# Patient Record
(demographics unavailable — no encounter records)

---

## 2018-08-20 NOTE — EDM.PDOC
ED HPI GENERAL MEDICAL PROBLEM





- General


Chief Complaint: Chest Pain


Stated Complaint: chest pain


Time Seen by Provider: 08/20/18 18:31


Source of Information: Reports: Patient


History Limitations: Reports: No Limitations





- History of Present Illness


INITIAL COMMENTS - FREE TEXT/NARRATIVE: 


HISTORY AND PHYSICAL:





History of present illness:


Patient is a 74-year-old male who is brought to the emergency room by law 

enforcement for medical evaluation. Patient reports he has had intermittent 

chest pain and numbness and tingling of the left arm for the past one hour. 

Patient reports that he "always has chest pain" but reports today was 

different. He states he is unable to explain how today's episode is different 

versus previous episodes. 


He denies any fever, chills, shortness of breath or cough. Denies any abdominal 

pain, nausea, vomiting, diarrhea or constipation.


History of hypertension.





Review of systems: 


As per history of present illness and below otherwise all systems reviewed and 

negative.





Past medical history: 


As per history of present illness and as reviewed below otherwise 

noncontributory.





Surgical history: 


As per history of present illness and as reviewed below otherwise 

noncontributory.





Social history: 


No reported history of drug or alcohol abuse.





Family history: 


As per history of present illness and as reviewed below otherwise 

noncontributory.





Physical exam:


General: Well-developed and well-nourished 74-year-old male. Alert and 

oriented. Nontoxic appearing and in no acute distress.


HEENT: Atraumatic, normocephalic, pupils equal and reactive bilaterally, 

negative for conjunctival pallor or scleral icterus, mucous membranes moist, 

throat clear, neck supple, nontender, trachea midline. No drooling or trismus 

noted. No meningeal signs


Lungs: Clear to auscultation, breath sounds equal bilaterally, chest nontender.


Heart: S1S2, regular rate and rhythm without overt murmur


Abdomen: Soft, nondistended, nontender. Negative for masses or 

hepatosplenomegaly. Negative for costovertebral tenderness.


Pelvis: Stable nontender.


Genitourinary: Deferred.


Rectal: Deferred.


Skin: Intact, warm, dry. No lesions or rashes noted.


Extremities: Atraumatic, negative for cords or calf pain. Neurovascular 

unremarkable.


Neuro: Awake, alert, oriented. Cranial nerves II through XII unremarkable. 

Cerebellum unremarkable. Motor and sensory unremarkable throughout. Exam 

nonfocal.





Notes:


Lab work is unremarkable. Chest x-ray shows minimal right basilar atelectasis 

area no evidence of pneumonia or infiltrate. It is currently pain-free. I did 

offer the patient admission. He is aware of risks and benefits of leaving 

without continued observation. He accepts those risks and would like to be 

discharged to home. Patient is currently in custody of law enforcement.





Upon getting the patient ready for discharge he states he would like to stay 

for observation. Vital signs are stable. Dr Piedra was consulted on this case, 

agreeable to observing this patient.





Diagnostics:


CBC, CMP, troponin, EKG, one view chest





Therapeutics:


Aspirin, Nitro Sublingual





Impression: 


Chest Pain r/o MI





Plan:


Observation admission with Telemetry





Definitive disposition and diagnosis as appropriate pending reevaluation and 

review of above.





  ** chest


Pain Score (Numeric/FACES): 8





- Related Data


 Allergies











Allergy/AdvReac Type Severity Reaction Status Date / Time


 


chocolate flavor Allergy  Cough Verified 08/20/18 18:34


 


oats Allergy  Cough Verified 08/20/18 18:34


 


cereal Allergy  Cough Uncoded 08/20/18 18:34











Home Meds: 


 Home Meds





Lisinopril/Hydrochlorothiazide [Lisinopril-Hctz 20-12.5 mg Tab] 1 tab PO DAILY 

12/21/15 [History]


Sertraline HCl 100 mg PO DAILY 12/21/15 [History]


amLODIPine Besylate [Amlodipine Besylate] 10 mg PO DAILY 12/21/15 [History]


Gabapentin [Neurontin] 300 mg PO BEDTIME #45 cap 06/23/16 [Rx]











Past Medical History


HEENT History: Reports: None


Other HEENT History: wears glasses


Cardiovascular History: Reports: Arrhythmia, Hypertension


Other Cardiovascular History: sinus


Respiratory History: Reports: None


Gastrointestinal History: Reports: GERD


Genitourinary History: Reports: BPH


Other Genitourinary History: swelling of the testicles for a year after he fell 

and hit his testicles on a side rail


Musculoskeletal History: Reports: Arthritis


Neurological History: Reports: CVA, Seizure


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 

include inability to read and write. Has been seizure free since 1991.


Psychiatric History: Reports: Anxiety


Endocrine/Metabolic History: Reports: None


Hematologic History: Reports: None


Immunologic History: Reports: None


Oncologic (Cancer) History: Reports: Bone


Other Oncologic History: Bone tumor


Dermatologic History: Reports: None





- Past Surgical History


Head Surgeries/Procedures: Reports: None


HEENT Surgical History: Reports: None


Cardiovascular Surgical History: Reports: None


Respiratory Surgical History: Reports: None


GI Surgical History: Reports: None


Male  Surgical History: Reports: None


Endocrine Surgical History: Reports: None


Neurological Surgical History: Reports: None


Musculoskeletal Surgical History: Reports: Knee Replacement, Shoulder Surgery, 

Other (See Below)


Other Musculoskeletal Surgeries/Procedures:: bone transplant for bone CA


Oncologic Surgical History: Reports: None


Other Oncologic Surgeries/Procedures: tumor removal right humerus





Social & Family History





- Family History


Family Medical History: Noncontributory


Cardiac: Reports: Heart Failure


Endocrine/Metabolic: Reports: Diabetes, type II





- Tobacco Use


Smoking Status *Q: Never Smoker





- Caffeine Use


Caffeine Use: Reports: Coffee, Soda





- Recreational Drug Use


Recreational Drug Use: No





ED ROS GENERAL





- Review of Systems


Review Of Systems: ROS reveals no pertinent complaints other than HPI.





ED EXAM, GENERAL





- Physical Exam


Exam: See Below (See dictation)





Course





- Vital Signs


Last Recorded V/S: 


 Last Vital Signs











Temp  97.6 F   08/20/18 18:32


 


Pulse  84   08/20/18 18:55


 


Resp  12   08/20/18 18:55


 


BP  99/68   08/20/18 18:55


 


Pulse Ox  91 L  08/20/18 18:55














- Orders/Labs/Meds


Orders: 


 Active Orders 24 hr











 Category Date Time Status


 


 EKG Documentation Completion [RC] STAT Care  08/20/18 18:26 Active


 


 Chest 1V Frontal [CR] Stat Exams  08/20/18 18:26 Taken


 


 UA W/MICROSCOPIC [URIN] Stat Lab  08/20/18 19:15 Ordered


 


 Nitroglycerin [Nitrostat] Med  08/20/18 18:43 Active





 0.4 mg SL Q5M PRN   


 


 Sodium Chloride 0.9% [Normal Saline] 1,000 ml Med  08/20/18 19:12 Active





 IV STAT   








 Medication Orders





Sodium Chloride (Normal Saline)  1,000 mls @ 999 mls/hr IV STAT ONE


   Stop: 08/20/18 20:12


Nitroglycerin (Nitrostat)  0.4 mg SL Q5M PRN


   PRN Reason: Chest Pain


   Last Admin: 08/20/18 18:49  Dose: 0.4 mg








Labs: 


 Laboratory Tests











  08/20/18 08/20/18 08/20/18 Range/Units





  18:35 18:35 19:15 


 


WBC  7.65    (4.0-11.0)  K/uL


 


RBC  4.67    (4.50-5.90)  M/uL


 


Hgb  14.1    (13.0-17.0)  g/dL


 


Hct  43.0    (38.0-50.0)  %


 


MCV  92.1    (80.0-98.0)  fL


 


MCH  30.2    (27.0-32.0)  pg


 


MCHC  32.8    (31.0-37.0)  g/dL


 


RDW Std Deviation  46.4    (28.0-62.0)  fl


 


RDW Coeff of Michel  14    (11.0-15.0)  %


 


Plt Count  253    (150-400)  K/uL


 


MPV  9.80    (7.40-12.00)  fL


 


Neut % (Auto)  62.1    (48.0-80.0)  %


 


Lymph % (Auto)  28.2    (16.0-40.0)  %


 


Mono % (Auto)  7.8    (0.0-15.0)  %


 


Eos % (Auto)  1.6    (0.0-7.0)  %


 


Baso % (Auto)  0.3    (0.0-1.5)  %


 


Neut # (Auto)  4.8    (1.4-5.7)  K/uL


 


Lymph # (Auto)  2.2    (0.6-2.4)  K/uL


 


Mono # (Auto)  0.6    (0.0-0.8)  K/uL


 


Eos # (Auto)  0.1    (0.0-0.7)  K/uL


 


Baso # (Auto)  0.0    (0.0-0.1)  K/uL


 


Nucleated RBC %  0.0    /100WBC


 


Nucleated RBCs #  0    K/uL


 


Sodium   140   (136-148)  mmol/L


 


Potassium   4.4   (3.5-5.1)  mmol/L


 


Chloride   105   ()  mmol/L


 


Carbon Dioxide   28.9   (21.0-32.0)  mmol/L


 


BUN   22 H   (7.0-18.0)  mg/dL


 


Creatinine   1.2   (0.8-1.3)  mg/dL


 


Est Cr Clr Drug Dosing   57.52   mL/min


 


Estimated GFR (MDRD)   59.2   ml/min


 


Glucose   111 H   ()  mg/dL


 


Calcium   9.5   (8.5-10.1)  mg/dL


 


Total Bilirubin   0.7   (0.2-1.0)  mg/dL


 


AST   17   (15-37)  IU/L


 


ALT   24   (14-63)  IU/L


 


Alkaline Phosphatase   80   ()  U/L


 


Troponin I   < 0.050   (0.000-0.056)  ng/mL


 


Total Protein   7.4   (6.4-8.2)  g/dL


 


Albumin   4.1   (3.4-5.0)  g/dL


 


Globulin   3.3   (2.0-3.5)  g/dL


 


Albumin/Globulin Ratio   1.2 L   (1.3-2.8)  


 


Urine Color    YELLOW  


 


Urine Appearance    CLEAR  


 


Urine pH    6.0  (5.0-8.0)  


 


Ur Specific Gravity    1.025  (1.001-1.035)  


 


Urine Protein    NEGATIVE  (NEGATIVE)  mg/dL


 


Urine Glucose (UA)    NEGATIVE  (NEGATIVE)  mg/dL


 


Urine Ketones    TRACE H  (NEGATIVE)  mg/dL


 


Urine Occult Blood    NEGATIVE  (NEGATIVE)  


 


Urine Nitrite    NEGATIVE  (NEGATIVE)  


 


Urine Bilirubin    NEGATIVE  (NEGATIVE)  


 


Urine Urobilinogen    0.2  (<2.0)  EU/dL


 


Ur Leukocyte Esterase    NEGATIVE  (NEGATIVE)  


 


Urine RBC    0-2  (0-2/HPF)  


 


Urine WBC    1-3  (0-5/HPF)  


 


Ur Epithelial Cells    RARE  (NONE-FEW)  


 


Urine Bacteria    FEW  (NEGATIVE)  


 


Urine Mucus    LIGHT  (NONE-MOD)  











Meds: 


Medications











Generic Name Dose Route Start Last Admin





  Trade Name Freq  PRN Reason Stop Dose Admin


 


Sodium Chloride  1,000 mls @ 999 mls/hr  08/20/18 19:12  





  Normal Saline  IV  08/20/18 20:12  





  STAT ONE   





     





     





     





     


 


Nitroglycerin  0.4 mg  08/20/18 18:43  08/20/18 18:49





  Nitrostat  SL   0.4 mg





  Q5M PRN   Administration





  Chest Pain   





     





     





     














Discontinued Medications














Generic Name Dose Route Start Last Admin





  Trade Name Freq  PRN Reason Stop Dose Admin


 


Aspirin  324 mg  08/20/18 18:26  08/20/18 18:48





  Aspirin  PO  08/20/18 18:27  324 mg





  ONETIME ONE   Administration





     





     





     





     














Departure





- Departure


Time of Disposition: 19:38


Disposition: Refer to Observation


Clinical Impression: 


 Chest pain, rule out acute myocardial infarction





Referrals: 


PCP,Unknown [Primary Care Provider] - 


Forms:  ED Department Discharge





- My Orders


Last 24 Hours: 


My Active Orders





08/20/18 18:26


EKG Documentation Completion [RC] STAT 


Chest 1V Frontal [CR] Stat 





08/20/18 18:43


Nitroglycerin [Nitrostat]   0.4 mg SL Q5M PRN 





08/20/18 19:12


Sodium Chloride 0.9% [Normal Saline] 1,000 ml IV STAT 





08/20/18 19:15


UA W/MICROSCOPIC [URIN] Stat 














- Assessment/Plan


Last 24 Hours: 


My Active Orders





08/20/18 18:26


EKG Documentation Completion [RC] STAT 


Chest 1V Frontal [CR] Stat 





08/20/18 18:43


Nitroglycerin [Nitrostat]   0.4 mg SL Q5M PRN 





08/20/18 19:12


Sodium Chloride 0.9% [Normal Saline] 1,000 ml IV STAT 





08/20/18 19:15


UA W/MICROSCOPIC [URIN] Stat

## 2018-08-20 NOTE — PCM.HP
H&P History of Present Illness





- General


Admit Problem/Dx: 


 Admission Diagnosis/Problem





Admission Diagnosis/Problem      Chest pain, rule out acute myocardial 

infarction








  ** chest


Pain Score (Numeric/FACES): 8





- Related Data


Allergies/Adverse Reactions: 


 Allergies











Allergy/AdvReac Type Severity Reaction Status Date / Time


 


alcohol Allergy  Vomiting Verified 08/20/18 20:53


 


chocolate flavor Allergy  Cough Verified 08/20/18 18:34


 


oats Allergy  Cough Verified 08/20/18 18:34


 


cereal Allergy  Cough Uncoded 08/20/18 18:34











Home Medications: 


 Home Meds





Lisinopril/Hydrochlorothiazide [Lisinopril-Hctz 20-12.5 mg Tab] 1 tab PO DAILY 

12/21/15 [History]


Sertraline HCl 100 mg PO DAILY 12/21/15 [History]


amLODIPine Besylate [Amlodipine Besylate] 10 mg PO DAILY 12/21/15 [History]


Gabapentin [Neurontin] 300 mg PO TID 08/20/18 [History]











Past Medical History


HEENT History: Reports: None


Other HEENT History: wears glasses


Cardiovascular History: Reports: Arrhythmia, Hypertension


Other Cardiovascular History: sinus


Respiratory History: Reports: None


Gastrointestinal History: Reports: GERD


Genitourinary History: Reports: BPH


Other Genitourinary History: swelling of the testicles


Musculoskeletal History: Reports: Arthritis


Neurological History: Reports: CVA, Seizure


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 

include inability to read and write. Has been seizure free since 1991.


Psychiatric History: Reports: None


Endocrine/Metabolic History: Reports: None


Hematologic History: Reports: None


Immunologic History: Reports: None


Oncologic (Cancer) History: Reports: Bone


Other Oncologic History: Bone tumor


Dermatologic History: Reports: None





- Infectious Disease History


Infectious Disease History: Reports: None





- Past Surgical History


Head Surgeries/Procedures: Reports: None


HEENT Surgical History: Reports: None


Cardiovascular Surgical History: Reports: None


Respiratory Surgical History: Reports: None


GI Surgical History: Reports: None


Male  Surgical History: Reports: None


Endocrine Surgical History: Reports: None


Neurological Surgical History: Reports: None


Musculoskeletal Surgical History: Reports: Knee Replacement, Shoulder Surgery, 

Other (See Below)


Other Musculoskeletal Surgeries/Procedures:: bone transplant for bone CA (1969)


Oncologic Surgical History: Reports: None


Other Oncologic Surgeries/Procedures: tumor removal right humerus


Dermatological Surgical History: Reports: None





Social & Family History





- Family History


Family Medical History: Noncontributory


Cardiac: Reports: Heart Failure


Endocrine/Metabolic: Reports: Diabetes, type II





- Tobacco Use


Smoking Status *Q: Never Smoker





- Caffeine Use


Caffeine Use: Reports: Coffee, Soda





- Recreational Drug Use


Recreational Drug Use: No





Exam





- Vital Signs


Vital Signs: 


 Last Vital Signs











Temp  97.6 F   08/20/18 18:32


 


Pulse  66   08/20/18 20:03


 


Resp  12   08/20/18 20:03


 


BP  124/74   08/20/18 20:03


 


Pulse Ox  91 L  08/20/18 20:03











Weight: 208 lb 1.862 oz





- Patient Data


Lab Results Last 24 hrs: 


 Laboratory Results - last 24 hr











  08/20/18 08/20/18 08/20/18 Range/Units





  18:35 18:35 19:15 


 


WBC  7.65    (4.0-11.0)  K/uL


 


RBC  4.67    (4.50-5.90)  M/uL


 


Hgb  14.1    (13.0-17.0)  g/dL


 


Hct  43.0    (38.0-50.0)  %


 


MCV  92.1    (80.0-98.0)  fL


 


MCH  30.2    (27.0-32.0)  pg


 


MCHC  32.8    (31.0-37.0)  g/dL


 


RDW Std Deviation  46.4    (28.0-62.0)  fl


 


RDW Coeff of Michel  14    (11.0-15.0)  %


 


Plt Count  253    (150-400)  K/uL


 


MPV  9.80    (7.40-12.00)  fL


 


Neut % (Auto)  62.1    (48.0-80.0)  %


 


Lymph % (Auto)  28.2    (16.0-40.0)  %


 


Mono % (Auto)  7.8    (0.0-15.0)  %


 


Eos % (Auto)  1.6    (0.0-7.0)  %


 


Baso % (Auto)  0.3    (0.0-1.5)  %


 


Neut # (Auto)  4.8    (1.4-5.7)  K/uL


 


Lymph # (Auto)  2.2    (0.6-2.4)  K/uL


 


Mono # (Auto)  0.6    (0.0-0.8)  K/uL


 


Eos # (Auto)  0.1    (0.0-0.7)  K/uL


 


Baso # (Auto)  0.0    (0.0-0.1)  K/uL


 


Nucleated RBC %  0.0    /100WBC


 


Nucleated RBCs #  0    K/uL


 


Sodium   140   (136-148)  mmol/L


 


Potassium   4.4   (3.5-5.1)  mmol/L


 


Chloride   105   ()  mmol/L


 


Carbon Dioxide   28.9   (21.0-32.0)  mmol/L


 


BUN   22 H   (7.0-18.0)  mg/dL


 


Creatinine   1.2   (0.8-1.3)  mg/dL


 


Est Cr Clr Drug Dosing   57.52   mL/min


 


Estimated GFR (MDRD)   59.2   ml/min


 


Glucose   111 H   ()  mg/dL


 


Calcium   9.5   (8.5-10.1)  mg/dL


 


Total Bilirubin   0.7   (0.2-1.0)  mg/dL


 


AST   17   (15-37)  IU/L


 


ALT   24   (14-63)  IU/L


 


Alkaline Phosphatase   80   ()  U/L


 


Troponin I   < 0.050   (0.000-0.056)  ng/mL


 


Total Protein   7.4   (6.4-8.2)  g/dL


 


Albumin   4.1   (3.4-5.0)  g/dL


 


Globulin   3.3   (2.0-3.5)  g/dL


 


Albumin/Globulin Ratio   1.2 L   (1.3-2.8)  


 


Urine Color    YELLOW  


 


Urine Appearance    CLEAR  


 


Urine pH    6.0  (5.0-8.0)  


 


Ur Specific Gravity    1.025  (1.001-1.035)  


 


Urine Protein    NEGATIVE  (NEGATIVE)  mg/dL


 


Urine Glucose (UA)    NEGATIVE  (NEGATIVE)  mg/dL


 


Urine Ketones    TRACE H  (NEGATIVE)  mg/dL


 


Urine Occult Blood    NEGATIVE  (NEGATIVE)  


 


Urine Nitrite    NEGATIVE  (NEGATIVE)  


 


Urine Bilirubin    NEGATIVE  (NEGATIVE)  


 


Urine Urobilinogen    0.2  (<2.0)  EU/dL


 


Ur Leukocyte Esterase    NEGATIVE  (NEGATIVE)  


 


Urine RBC    0-2  (0-2/HPF)  


 


Urine WBC    1-3  (0-5/HPF)  


 


Ur Epithelial Cells    RARE  (NONE-FEW)  


 


Urine Bacteria    FEW  (NEGATIVE)  


 


Urine Mucus    LIGHT  (NONE-MOD)  











Result Diagrams: 


 08/20/18 18:35





 08/20/18 18:35


Orders Last 24hrs: 


 Active Orders 24 hr











 Category Date Time Status


 


 Admission Status [Patient Status] [ADT] Stat ADT  08/20/18 19:42 Active


 


 EKG Documentation Completion [RC] STAT Care  08/20/18 18:26 Active


 


 Oxygen Therapy [RC] PRN Care  08/20/18 20:48 Active


 


 Pulse Oximetry [RC] PRN Care  08/20/18 20:48 Active


 


 RT Aerosol Therapy [RC] ASDIRECTED Care  08/20/18 20:49 Active


 


 Telemetry Monitoring [Cardiac Monitoring] [RC] Q8H Care  08/20/18 20:04 Active


 


 Up ad Dora [RC] ASDIRECTED Care  08/20/18 20:48 Active


 


 VTE/DVT Education [RC] PER UNIT ROUTINE Care  08/20/18 20:48 Active


 


 Vital Signs [RC] Q4H Care  08/20/18 20:48 Active


 


 Heart Healthy Diet [DIET] Diet  08/20/18 Dinner Active


 


 Chest 1V Frontal [CR] Stat Exams  08/20/18 18:26 Taken


 


 CBC WITH AUTO DIFF [HEME] AM Lab  08/21/18 05:11 Ordered


 


 CBC WITH AUTO DIFF [HEME] AM Lab  08/22/18 05:11 Ordered


 


 CBC WITH AUTO DIFF [HEME] AM Lab  08/23/18 05:11 Ordered


 


 CBC WITH AUTO DIFF [HEME] AM Lab  08/24/18 05:11 Ordered


 


 COMPREHENSIVE METABOLIC PN,CMP [CHEM] AM Lab  08/21/18 05:11 Ordered


 


 COMPREHENSIVE METABOLIC PN,CMP [CHEM] AM Lab  08/22/18 05:11 Ordered


 


 COMPREHENSIVE METABOLIC PN,CMP [CHEM] AM Lab  08/23/18 05:11 Ordered


 


 COMPREHENSIVE METABOLIC PN,CMP [CHEM] AM Lab  08/24/18 05:11 Ordered


 


 LIPID PANEL [CHEM] AM Lab  08/21/18 05:11 Ordered


 


 TROPONIN I [CHEM] Q6H Lab  08/21/18 00:00 Ordered


 


 TROPONIN I [CHEM] Q6H Lab  08/21/18 06:00 Ordered


 


 UA W/MICROSCOPIC [URIN] Stat Lab  08/20/18 19:15 Ordered


 


 Acetaminophen [Tylenol] Med  08/20/18 20:48 Active





 650 mg PO Q4H PRN   


 


 Albuterol/Ipratropium [DuoNeb 3.0-0.5 MG/3 ML] Med  08/20/18 20:48 Active





 3 ml NEB Q4HRRT PRN   


 


 Enoxaparin [Lovenox] Med  08/20/18 21:00 Active





 40 mg SUBCUT Q24H   


 


 Gabapentin [Neurontin] Med  08/20/18 22:00 Active





 300 mg PO TID   


 


 Lisinopril [Prinivil] Med  08/21/18 09:00 Active





 20 mg PO DAILY   


 


 Morphine Med  08/20/18 20:48 Active





 2 mg IVPUSH Q2H PRN   


 


 Nitroglycerin [Nitrostat] Med  08/20/18 18:43 Active





 0.4 mg SL Q5M PRN   


 


 Ondansetron [Zofran] Med  08/20/18 20:48 Active





 4 mg IVPUSH Q4H PRN   


 


 Sertraline [Zoloft] Med  08/21/18 09:00 Active





 100 mg PO DAILY   


 


 amLODIPine [Norvasc] Med  08/21/18 09:00 Active





 10 mg PO DAILY   


 


 hydroCHLOROthiazide Med  08/21/18 09:00 Active





 12.5 mg PO DAILY   


 


 Resuscitation Status Routine Resus Stat  08/20/18 20:48 Ordered








 Medication Orders





Acetaminophen (Tylenol)  650 mg PO Q4H PRN


   PRN Reason: Pain (Mild 1-3)/fever


Albuterol/Ipratropium (Duoneb 3.0-0.5 Mg/3 Ml)  3 ml NEB Q4HRRT PRN


   PRN Reason: Shortness Of Breath/wheezing


Amlodipine Besylate (Norvasc)  10 mg PO DAILY [facility]


Enoxaparin Sodium (Lovenox)  40 mg SUBCUT Q24H [facility]


   Last Admin: 08/20/18 21:55  Dose: 40 mg


Gabapentin (Neurontin)  300 mg PO TID [facility]


   Last Admin: 08/20/18 21:55  Dose: 300 mg


Hydrochlorothiazide (Hydrochlorothiazide)  12.5 mg PO DAILY [facility]


Lisinopril (Prinivil)  20 mg PO DAILY [facility]


Morphine Sulfate (Morphine)  2 mg IVPUSH Q2H PRN


   PRN Reason: Pain (severe 7-10)


   Stop: 08/21/18 20:49


Nitroglycerin (Nitrostat)  0.4 mg SL Q5M PRN


   PRN Reason: Chest Pain


   Last Admin: 08/20/18 18:49  Dose: 0.4 mg


Ondansetron HCl (Zofran)  4 mg IVPUSH Q4H PRN


   PRN Reason: Nausea


Sertraline HCl (Zoloft)  100 mg PO DAILY KAYCE

## 2018-08-21 NOTE — CR
EXAM DATE: 18



PATIENT'S AGE: 74



Patient: ELOISE RANDALL



Facility: [place], [place]

Patient ID: [id number]

Site Patient ID: [id number].

Site Accession #: [id number]

: 1943

Study: XRay Chest [id number]/20/2018 6:47:04 PM

Ordering Physician: Doctor Temp



Final Report: 

INDICATION: Chest pain



TECHNIQUE: Chest radiograph 1 view



COMPARISON: 16



FINDINGS: Moderate degradation of image quality noted due to body habitus. 



Mediastinum: The mediastinum is normal in appearance. The heart silhouette is 
normal in size and morphology. 



Lung: Minimal right basilar atelectasis noted with small lung volumes. No sign 
of pleural effusion seen. No pneumothorax is identified. 



Musculoskeletal: Unremarkable for age. 



IMPRESSION: 

1. Minimal right basilar atelectasis noted with small lung volumes.





Dictated by: Jeff Amor MD @ 2018 19:17:13

(Electronic Signature)







Report Signed by Proxy.
[facility] HospitalEDDIE

## 2018-08-21 NOTE — CT
EXAM DATE: 18



PATIENT'S AGE: 74





Patient: ELOISE RANDALL



Facility: [place], [place]

Patient ID: [id number]

Site Patient ID: [id number].

Site Accession #: [id number]

: 1943

Study: CT Chest Angio [id number]/21/2018 12:34:04 AM

Ordering Physician: Tadeo Crespo



Final Report: 

INDICATION:

Chest pain, shortness of breath 



TECHNIQUE:

CT chest pulmonary PE protocol acquired with 50 cc Isovue 370 IV contrast.



COMPARISON:

Chest radiograph 2018 



FINDINGS:

Cardiovascular structures: Normal vascular enhancement of the pulmonary arteries
, no sign of pulmonary embolism. Borderline cardiomegaly. There are coronary 
artery calcifications. The ascending aorta measures 4.2 cm in diameter. 



Mediastinum and bharathi: No mass or adenopathy. Small hiatal hernia. 



Lungs: Clear. 



Pleura and pericardium: No effusions. 



Chest wall and axilla: No mass or adenopathy. 



Upper abdomen: Unremarkable. 



Bones: No significant findings. 



IMPRESSION:

No pulmonary embolism or pneumonia. 



Ectasia of the ascending aorta.



Borderline cardiomegaly. Coronary artery disease. 



Small hiatal hernia.



Please note that all CT scans at this facility use dose modulation, iterative 
reconstruction, and/or weight-based dosing when appropriate to reduce radiation 
dose to as low as reasonably achievable.



Dictated by Pauline Purdy MD @ Aug 21 2018 12:45AM

(Electronic Signature)







Report Signed by Proxy.
[facility]

## 2018-08-21 NOTE — HP
YOB: 1943

 

PRIMARY CARE PHYSICIAN:

Unknown PCP

 

HISTORY OF PRESENT ILLNESS:

The patient is a 74 years old man, who is in group home, presented today because of

chest pain that was 5/10 in intensity and lasted for an hour, it came at rest

and was associated with some tingling in the left arm.  The patient pain is not

positional, not tender to palpation, it is not worse with breathing.  The

patient did not have any chest pain before.

 

PAST MEDICAL HISTORY:

1. Hypertension.

2. Hyperlipidemia.

3. GERD.

4. History of stroke.

5. 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

 

ALLERGIES:

He has food allergies.

 

PAST SURGICAL HISTORY:

In 1959, he had a bone transplant.

 

SOCIAL HISTORY:

He does not smoke.  No alcohol use.  No drug use.

 

FAMILY HISTORY:

He has a family history of heart failure and diabetes type 2.

 

ALLERGIES:

The patient is allergic to alcohol, chocolate flavor, oats, cereals.

 

PHYSICAL EXAMINATION:

VITAL SIGNS:  At admission, his pulse rate was 84, blood pressure 99/68, and

respiratory rate 12, oxygen saturation by pulse oximetry 91.  Oxygen flow rate

2.  

HEENT:  Head is atraumatic and normocephalic.  Pupils equally reactive to light.

NECK:  Supple.  No thyromegaly.  No lymphadenopathy.

HEART:  S1 and S2.  Regular rhythm and rate.  No murmurs.

LUNGS:  Clear to auscultation bilaterally.

ABDOMEN: Soft, nontender.  Positive bowel sounds EXTREMITIES:  No edema.

NEUROLOGIC:  The patient is alert and oriented x3.  There are no gross focal

neurologic deficits.

 

DIAGNOSTIC DATA:

EKG done in the emergency room shows sinus rhythm, no ST-T depression or

elevation.

 

LABORATORY DATA:

At admission:  WBC 7.55, hemoglobin 14.1, hematocrit 43, and platelet count 253.

Sodium 140, potassium 4.4, chloride 105, CO2 of 28.9, BUN 22, creatinine 1.2,

and glucose 111, calcium 9.5, total bilirubin 0.7, AST 17, ALT 24, alkaline

phosphatase 80.  Troponin less than 0.05.  Total protein 7.4, albumin 8.1,

globulin 3.3.  Urinalysis was negative.

 

ASSESSMENT:

1. Chest pain, rule out acute coronary syndrome.

2. Hypoxia.

 

PLAN:  We will admit the patient to medical floor and we will order cardiac

enzymes.  We will put the patient on telemetry.  We will follow up 3 sets of

troponins.  Aspirin 325 mg was given in the ER.  We will follow up lipid profile

in the morning and hemoglobin A1c.  We will also order a D-dimer.  We will give

the patient oxygen on nasal cannula.  The patient will need to be evaluated in

the morning for oxygen at home.

 

 

CONNIE / CHAGO

DD:  08/20/2018 22:34:06

DT:  08/20/2018 23:43:44

Job #:  [id number]

   Discharge summary

Patient troponins were negative for 3 sets.

 His lipid profile was nl.

 His oxygen improved in am to mid 90" at rest without nasal canula 

 his d dimer was elevated and he had a CTPA done which showed  ectasia of the 
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

 I have discussed with Radiologist , there are no signs of dissection  and he 
recommended patient to f/up with Cardiology  . Patient was discharged to group home 
to f/up with PCP and with Cardiology
MTDD